# Patient Record
Sex: MALE | Race: WHITE | Employment: OTHER | URBAN - NONMETROPOLITAN AREA
[De-identification: names, ages, dates, MRNs, and addresses within clinical notes are randomized per-mention and may not be internally consistent; named-entity substitution may affect disease eponyms.]

---

## 2017-07-29 ENCOUNTER — HOSPITAL ENCOUNTER (EMERGENCY)
Age: 72
Discharge: HOME | End: 2017-07-29
Payer: MEDICARE

## 2017-07-29 VITALS
RESPIRATION RATE: 16 BRPM | OXYGEN SATURATION: 96 % | HEART RATE: 62 BPM | BODY MASS INDEX: 28.25 KG/M2 | DIASTOLIC BLOOD PRESSURE: 59 MMHG | WEIGHT: 180 LBS | SYSTOLIC BLOOD PRESSURE: 116 MMHG | TEMPERATURE: 97.8 F | HEIGHT: 67 IN

## 2017-07-29 DIAGNOSIS — H61.23 HEARING LOSS DUE TO CERUMEN IMPACTION, BILATERAL: Primary | ICD-10-CM

## 2017-07-29 PROCEDURE — 69209 REMOVE IMPACTED EAR WAX UNI: CPT

## 2017-07-29 PROCEDURE — 99213 OFFICE O/P EST LOW 20 MIN: CPT

## 2017-07-29 PROCEDURE — 6370000000 HC RX 637 (ALT 250 FOR IP): Performed by: NURSE PRACTITIONER

## 2017-07-29 PROCEDURE — 99212 OFFICE O/P EST SF 10 MIN: CPT | Performed by: NURSE PRACTITIONER

## 2017-07-29 RX ORDER — M-VIT,TX,IRON,MINS/CALC/FOLIC 27MG-0.4MG
1 TABLET ORAL DAILY
COMMUNITY

## 2017-07-29 RX ADMIN — CARBAMIDE PEROXIDE 6.5% 5 DROP: 6.5 LIQUID AURICULAR (OTIC) at 14:02

## 2017-07-29 ASSESSMENT — ENCOUNTER SYMPTOMS: RESPIRATORY NEGATIVE: 1

## 2020-12-11 RX ORDER — LACTULOSE 10 G/15ML
20 SOLUTION ORAL 3 TIMES DAILY
COMMUNITY

## 2020-12-11 RX ORDER — PHENOBARBITAL 60 MG/1
60 TABLET ORAL 2 TIMES DAILY
COMMUNITY

## 2020-12-11 RX ORDER — FENOFIBRATE 145 MG/1
145 TABLET, COATED ORAL DAILY
COMMUNITY

## 2020-12-11 RX ORDER — POLYETHYLENE GLYCOL 3350 17 G/17G
17 POWDER, FOR SOLUTION ORAL PRN
COMMUNITY

## 2020-12-11 RX ORDER — CLOPIDOGREL BISULFATE 75 MG/1
75 TABLET ORAL DAILY
COMMUNITY

## 2020-12-11 RX ORDER — LAMOTRIGINE 150 MG/1
150 TABLET ORAL 2 TIMES DAILY
COMMUNITY

## 2020-12-11 RX ORDER — ROSUVASTATIN CALCIUM 5 MG/1
5 TABLET, COATED ORAL DAILY
COMMUNITY

## 2020-12-18 ENCOUNTER — HOSPITAL ENCOUNTER (OUTPATIENT)
Age: 75
Setting detail: OUTPATIENT SURGERY
Discharge: HOME | End: 2020-12-18
Attending: SPECIALIST | Admitting: SPECIALIST
Payer: MEDICARE

## 2020-12-18 VITALS
HEIGHT: 66 IN | TEMPERATURE: 96.6 F | BODY MASS INDEX: 26.58 KG/M2 | OXYGEN SATURATION: 97 % | RESPIRATION RATE: 12 BRPM | DIASTOLIC BLOOD PRESSURE: 60 MMHG | SYSTOLIC BLOOD PRESSURE: 126 MMHG | WEIGHT: 165.4 LBS | HEART RATE: 51 BPM

## 2020-12-18 PROCEDURE — 7100000011 HC PHASE II RECOVERY - ADDTL 15 MIN: Performed by: SPECIALIST

## 2020-12-18 PROCEDURE — 3600000002 HC SURGERY LEVEL 2 BASE: Performed by: SPECIALIST

## 2020-12-18 PROCEDURE — 2500000003 HC RX 250 WO HCPCS: Performed by: SPECIALIST

## 2020-12-18 PROCEDURE — 2709999900 HC NON-CHARGEABLE SUPPLY: Performed by: SPECIALIST

## 2020-12-18 PROCEDURE — 7100000010 HC PHASE II RECOVERY - FIRST 15 MIN: Performed by: SPECIALIST

## 2020-12-18 PROCEDURE — 3600000012 HC SURGERY LEVEL 2 ADDTL 15MIN: Performed by: SPECIALIST

## 2020-12-18 RX ORDER — LIDOCAINE HYDROCHLORIDE AND EPINEPHRINE 10; 10 MG/ML; UG/ML
INJECTION, SOLUTION INFILTRATION; PERINEURAL PRN
Status: DISCONTINUED | OUTPATIENT
Start: 2020-12-18 | End: 2020-12-18 | Stop reason: ALTCHOICE

## 2020-12-18 ASSESSMENT — PAIN - FUNCTIONAL ASSESSMENT: PAIN_FUNCTIONAL_ASSESSMENT: 0-10

## 2020-12-18 NOTE — OP NOTE
Operative Note    Patient name: Erin Russell             Medical Record Number: [id number]    Primary Care Physician: Feliberto Guevara MD     1945    Date of Procedure: 2020    Pre-operative Diagnosis: 3cm2 defect of left scaphoid fossa s/p MOHS for basal cell carcinoma    Post-operative Diagnosis: Same    Procedure Performed: Full thickness skin graft (3 cm2) repair of left scaphoid fossa defect (CPT 63948)    Surgeons/Assistants: Dr. Tyra Gama     Estimated Blood Loss: 5ml     Complications: none immediately appreciated    Procedure: With the patient lying in the supine position and under adequate local anesthesia consisting of 11 ml of 1% Lidocaine 1:100,000 with epinephrine solution of the donor site of the left preauricular sulcus as well as the left ear defect. The area was prepped and draped in the standard surgical fashion. The patient has very thin skin and a large defect which could not be closed primarily, therefore a full thickness skin graft was taken. It was defatted and secured in position with a 3-0 silk suture tie and then a 5-0 fast absorbable suture was placed in a simple running fashion. A Bacitracin Xeroform and moist cotton ball tie over bolster was secured using the tails of the silk sutures. The donor site was closed with a 4-0 Monocryl suture placed in interrupted buried fashion and then Histoacryl respectively. The patient tolerated the procedure well and remained hemodynamically stable throughout the procedure and was quite comfortable throughout the operative course.     Clinical staging for cancer cases:  Ct  Cn  Cm    Tyra Gama  Electronically signed by me on 2020 at 10:47 AM  Operative Note      Patient: Erin Russell  YOB: 1945  MRN: [id number]    Date of Procedure: 2020    Pre-Op Diagnosis: BCC LEFT SCAPHOID FOSSA    Post-Op Diagnosis: Same       Procedure(s):  MOHS DEFECT REPAIR BCC LEFT SCAPHOID FOSSA    Surgeon(s):  Catarino Gonzalez MD    Assistant:   Physician Assistant: Sri Guerrero PA-C    Anesthesia: Local    Estimated Blood Loss (mL): Minimal    Complications: None    Specimens:   * No specimens in log *    Implants:  * No implants in log *      Drains: * No LDAs found *    Findings: 3cm2 defect of left scaphoid fossa s/p MOHS for basal cell carcinoma    Detailed Description of Procedure:   Full thickness skin graft (3 cm2) repair of left scaphoid fossa defect (CPT 51775)      Electronically signed by Catarino Gonzalez MD on 12/18/2020 at 10:47 AM

## 2020-12-18 NOTE — PROGRESS NOTES
1048: Patient arrived to Phase II recovery via chair. Report received from surgical RN. 1050: VSS, patient denies pain at this time. Wife brought to bedside. Snack and drink provided. Bolster dressing to L ear remains clean, dry and intact. Incision in front of L ear clean and dry with surgical glue present. Call light placed within reach. 1124: Discharge instructions reviewed with patient and patient's wife. Patient has allergy to codeine. Ivet Palacio PA-C updated on allergy and no new orders given. Norco script shredded and patient advised to take Tylenol or pain upon discharge. Patient dressed at bedside with wife present. 1140: Patient discharged home in stable condition with wife.

## 2022-04-09 ENCOUNTER — APPOINTMENT (OUTPATIENT)
Dept: GENERAL RADIOLOGY | Age: 77
End: 2022-04-09
Payer: MEDICARE

## 2022-04-09 ENCOUNTER — HOSPITAL ENCOUNTER (EMERGENCY)
Age: 77
Discharge: HOME | End: 2022-04-09
Attending: EMERGENCY MEDICINE
Payer: MEDICARE

## 2022-04-09 ENCOUNTER — APPOINTMENT (OUTPATIENT)
Dept: CT IMAGING | Age: 77
End: 2022-04-09
Payer: MEDICARE

## 2022-04-09 VITALS
SYSTOLIC BLOOD PRESSURE: 134 MMHG | RESPIRATION RATE: 16 BRPM | TEMPERATURE: 97.8 F | HEIGHT: 66 IN | OXYGEN SATURATION: 99 % | BODY MASS INDEX: 24.91 KG/M2 | HEART RATE: 50 BPM | DIASTOLIC BLOOD PRESSURE: 71 MMHG | WEIGHT: 155 LBS

## 2022-04-09 DIAGNOSIS — R56.9 SEIZURE (HCC): Primary | ICD-10-CM

## 2022-04-09 LAB
ALBUMIN SERPL-MCNC: 4.8 G/DL (ref 3.5–5.1)
ALP BLD-CCNC: 27 U/L (ref 38–126)
ALT SERPL-CCNC: 10 U/L (ref 11–66)
AMMONIA: 31 UMOL/L (ref 11–60)
ANION GAP SERPL CALCULATED.3IONS-SCNC: 12 MEQ/L (ref 8–16)
AST SERPL-CCNC: 27 U/L (ref 5–40)
BASE EXCESS MIXED: 2.9 MMOL/L (ref -2–3)
BASOPHILS # BLD: 0.8 %
BASOPHILS ABSOLUTE: 0 THOU/MM3 (ref 0–0.1)
BILIRUB SERPL-MCNC: 0.3 MG/DL (ref 0.3–1.2)
BILIRUBIN DIRECT: < 0.2 MG/DL (ref 0–0.3)
BILIRUBIN URINE: NEGATIVE
BLOOD, URINE: NEGATIVE
BUN BLDV-MCNC: 24 MG/DL (ref 7–22)
CALCIUM SERPL-MCNC: 10.5 MG/DL (ref 8.5–10.5)
CHARACTER, URINE: CLEAR
CHLORIDE BLD-SCNC: 102 MEQ/L (ref 98–111)
CO2: 27 MEQ/L (ref 23–33)
COLLECTED BY:: ABNORMAL
COLOR: YELLOW
CREAT SERPL-MCNC: 1.9 MG/DL (ref 0.4–1.2)
EOSINOPHIL # BLD: 5 %
EOSINOPHILS ABSOLUTE: 0.2 THOU/MM3 (ref 0–0.4)
ERYTHROCYTE [DISTWIDTH] IN BLOOD BY AUTOMATED COUNT: 13 % (ref 11.5–14.5)
ERYTHROCYTE [DISTWIDTH] IN BLOOD BY AUTOMATED COUNT: 50.3 FL (ref 35–45)
GFR SERPL CREATININE-BSD FRML MDRD: 35 ML/MIN/1.73M2
GLUCOSE BLD-MCNC: 133 MG/DL (ref 70–108)
GLUCOSE URINE: NEGATIVE MG/DL
HCO3, MIXED: 26 MMOL/L (ref 23–28)
HCT VFR BLD CALC: 38.5 % (ref 42–52)
HEMOGLOBIN: 12.4 GM/DL (ref 14–18)
IMMATURE GRANS (ABS): 0.01 THOU/MM3 (ref 0–0.07)
IMMATURE GRANULOCYTES: 0.3 %
KETONES, URINE: NEGATIVE
LACTIC ACID: 1.7 MMOL/L (ref 0.5–2)
LEUKOCYTE ESTERASE, URINE: NEGATIVE
LYMPHOCYTES # BLD: 32.8 %
LYMPHOCYTES ABSOLUTE: 1.3 THOU/MM3 (ref 1–4.8)
MAGNESIUM: 2.4 MG/DL (ref 1.6–2.4)
MCH RBC QN AUTO: 34.1 PG (ref 26–33)
MCHC RBC AUTO-ENTMCNC: 32.2 GM/DL (ref 32.2–35.5)
MCV RBC AUTO: 105.8 FL (ref 80–94)
MONOCYTES # BLD: 11.5 %
MONOCYTES ABSOLUTE: 0.5 THOU/MM3 (ref 0.4–1.3)
NITRITE, URINE: NEGATIVE
NUCLEATED RED BLOOD CELLS: 0 /100 WBC
O2 SAT, MIXED: 50 %
OSMOLALITY CALCULATION: 287.2 MOSMOL/KG (ref 275–300)
PCO2, MIXED VENOUS: 36 MMHG (ref 41–51)
PH UA: 8 (ref 5–9)
PH, MIXED: 7.48 (ref 7.31–7.41)
PLATELET # BLD: 180 THOU/MM3 (ref 130–400)
PMV BLD AUTO: 9 FL (ref 9.4–12.4)
PO2 MIXED: 25 MMHG (ref 25–40)
POTASSIUM REFLEX MAGNESIUM: 4.6 MEQ/L (ref 3.5–5.2)
PROCALCITONIN: 0.06 NG/ML (ref 0.01–0.09)
PROTEIN UA: NEGATIVE
RBC # BLD: 3.64 MILL/MM3 (ref 4.7–6.1)
SEG NEUTROPHILS: 49.6 %
SEGMENTED NEUTROPHILS ABSOLUTE COUNT: 2 THOU/MM3 (ref 1.8–7.7)
SODIUM BLD-SCNC: 141 MEQ/L (ref 135–145)
SPECIFIC GRAVITY, URINE: 1.01 (ref 1–1.03)
TOTAL CK: 257 U/L (ref 55–170)
TOTAL PROTEIN: 7.6 G/DL (ref 6.1–8)
TROPONIN T: 0.02 NG/ML
TROPONIN T: < 0.01 NG/ML
UROBILINOGEN, URINE: 0.2 EU/DL (ref 0–1)
WBC # BLD: 4 THOU/MM3 (ref 4.8–10.8)

## 2022-04-09 PROCEDURE — 71046 X-RAY EXAM CHEST 2 VIEWS: CPT

## 2022-04-09 PROCEDURE — 80076 HEPATIC FUNCTION PANEL: CPT

## 2022-04-09 PROCEDURE — 82550 ASSAY OF CK (CPK): CPT

## 2022-04-09 PROCEDURE — 85025 COMPLETE CBC W/AUTO DIFF WBC: CPT

## 2022-04-09 PROCEDURE — 80048 BASIC METABOLIC PNL TOTAL CA: CPT

## 2022-04-09 PROCEDURE — 84484 ASSAY OF TROPONIN QUANT: CPT

## 2022-04-09 PROCEDURE — 70450 CT HEAD/BRAIN W/O DYE: CPT

## 2022-04-09 PROCEDURE — 93005 ELECTROCARDIOGRAM TRACING: CPT | Performed by: STUDENT IN AN ORGANIZED HEALTH CARE EDUCATION/TRAINING PROGRAM

## 2022-04-09 PROCEDURE — 2580000003 HC RX 258: Performed by: STUDENT IN AN ORGANIZED HEALTH CARE EDUCATION/TRAINING PROGRAM

## 2022-04-09 PROCEDURE — 83605 ASSAY OF LACTIC ACID: CPT

## 2022-04-09 PROCEDURE — 83735 ASSAY OF MAGNESIUM: CPT

## 2022-04-09 PROCEDURE — 82140 ASSAY OF AMMONIA: CPT

## 2022-04-09 PROCEDURE — 81003 URINALYSIS AUTO W/O SCOPE: CPT

## 2022-04-09 PROCEDURE — 36415 COLL VENOUS BLD VENIPUNCTURE: CPT

## 2022-04-09 PROCEDURE — 99285 EMERGENCY DEPT VISIT HI MDM: CPT

## 2022-04-09 PROCEDURE — 82803 BLOOD GASES ANY COMBINATION: CPT

## 2022-04-09 PROCEDURE — 84145 PROCALCITONIN (PCT): CPT

## 2022-04-09 RX ORDER — 0.9 % SODIUM CHLORIDE 0.9 %
1000 INTRAVENOUS SOLUTION INTRAVENOUS ONCE
Status: COMPLETED | OUTPATIENT
Start: 2022-04-09 | End: 2022-04-09

## 2022-04-09 RX ADMIN — SODIUM CHLORIDE 1000 ML: 9 INJECTION, SOLUTION INTRAVENOUS at 11:55

## 2022-04-09 ASSESSMENT — ENCOUNTER SYMPTOMS
FACIAL SWELLING: 0
NAUSEA: 0
PHOTOPHOBIA: 0
VOMITING: 0
CONSTIPATION: 0
SHORTNESS OF BREATH: 0
SINUS PAIN: 0
ABDOMINAL PAIN: 0
SINUS PRESSURE: 0
CHEST TIGHTNESS: 0
COUGH: 0
DIARRHEA: 0
WHEEZING: 0

## 2022-04-09 NOTE — ED TRIAGE NOTES
Patient presents via [facility] to ER with complaints of dysarthria and blurry vision that started this morning at 0750. EMS obtained blood sugar with result 138. Patient alert and oriented x4. NIHSS obtained. Family in room reports patient has history of seizure. EKG obtained. Telemetry applied.

## 2022-04-09 NOTE — ED PROVIDER NOTES
[facility] ENCOUNTER          Pt Name: Noah Obrien  MRN: [id number]  [place] 1945  Date of evaluation: 4/9/2022  Treating Resident Physician: Mirna Presley DO  Supervising Physician: Dr. Diann Mazariegos       Chief Complaint   Patient presents with    Other     Dysarthria    Blurred Vision     History obtained from the patient. HISTORY OF PRESENT ILLNESS    HPI  Noah Obrien is a 69 y.o. male with past medical history of HLD, hypothyroidism, primary generalized epilepsy, CVA, sleep apnea, CKD III who presents to the emergency department for evaluation of dysarthria and blurred vision. He reports sudden onset vision disturbance at around 8 AM this morning described as \"out of focus\". He notes associated dysarthria but denies any weakness or headaches. Of note, patient follows with Dr. Edilma Leija at [facility]  Neurology.    The patient has no other acute complaints at this time. Last seen 3/9/2022. Per wife medications were adjusted with intent of weaning off phenobarbital and increasing dosage of Vimpat. REVIEW OF SYSTEMS   Review of Systems   Constitutional: Positive for activity change. Negative for appetite change, chills, fatigue and fever. HENT: Negative for congestion, facial swelling, sinus pressure and sinus pain. Eyes: Positive for visual disturbance. Negative for photophobia. Respiratory: Negative for cough, chest tightness, shortness of breath and wheezing. Cardiovascular: Negative for chest pain, palpitations and leg swelling. Gastrointestinal: Negative for abdominal pain, constipation, diarrhea, nausea and vomiting. Endocrine: Negative for polyuria. Genitourinary: Negative for difficulty urinating, dysuria, frequency and hematuria. Musculoskeletal: Negative. Skin: Negative. Neurological: Positive for speech difficulty. Negative for dizziness, weakness, numbness and headaches. Psychiatric/Behavioral: Positive for agitation. Negative for behavioral problems and confusion. The patient is not nervous/anxious. PAST MEDICAL AND SURGICAL HISTORY     Past Medical History:   Diagnosis Date    Arthritis     Cancer ([facility] [address]     skin    Cerebral artery occlusion with cerebral infarction ([facility] [address]     Hyperlipidemia     Hypothyroid     Kidney disease     Seizures ([facility] [address]     Sleep apnea      Past Surgical History:   Procedure Laterality Date    EYE SURGERY      HEMORRHOID SURGERY      JOINT REPLACEMENT      hip    MOHS SURGERY Left 12/18/2020    MOHS DEFECT REPAIR BCC LEFT SCAPHOID FOSSA performed by Colton Harper MD at [facility] [address]   No current facility-administered medications for this encounter. Current Outpatient Medications:     potassium & sodium phosphates (PHOS-NAK) 280-160-250 MG PACK, Take 1 packet by mouth 2 times daily, Disp: , Rfl:     PHENobarbital (LUMINAL) 60 MG tablet, Take 60 mg by mouth 2 times daily. , Disp: , Rfl:     clopidogrel (PLAVIX) 75 MG tablet, Take 75 mg by mouth daily, Disp: , Rfl:     lamoTRIgine (LAMICTAL) 150 MG tablet, Take 150 mg by mouth 2 times daily 1.5 tabs, Disp: , Rfl:     fenofibrate (TRICOR) 145 MG tablet, Take 145 mg by mouth daily, Disp: , Rfl:     lactulose (CHRONULAC) 10 GM/15ML solution, Take 20 g by mouth 3 times daily, Disp: , Rfl:     rosuvastatin (CRESTOR) 5 MG tablet, Take 5 mg by mouth daily Sunday, Tuesday, Thursday, saturday, Disp: , Rfl:     Iron-Folic Acid-Vit H92 (IRON FORMULA PO), Take 65 mg by mouth daily, Disp: , Rfl:     polyethylene glycol (MIRALAX) 17 g PACK packet, Take 17 g by mouth as needed, Disp: , Rfl:     Multiple Vitamins-Minerals (THERAPEUTIC MULTIVITAMIN-MINERALS) tablet, Take 1 tablet by mouth daily, Disp: , Rfl:     THYROID PO, Take 50 mcg by mouth Five times weekly. , Disp: , Rfl:     Cholecalciferol (VITAMIN D3) 3000 UNITS TABS, Take 400 Units by mouth Daily with supper., Disp: , Rfl:     Calcium-Magnesium-Vitamin D (CALCIUM MAGNESIUM PO), Take  by mouth three times daily. , Disp: , Rfl:       SOCIAL HISTORY     Social History     Social History Narrative    Not on file     Social History     Tobacco Use    Smoking status: Never Smoker    Smokeless tobacco: Not on file   Substance Use Topics    Alcohol use: No    Drug use: No         ALLERGIES     Allergies   Allergen Reactions    Ciprofloxacin Itching    Fentanyl Other (See Comments)     Stopped breathing    Statins     Versed [Midazolam] Other (See Comments)     Stopped breathing    Codeine Rash    Metronidazole Rash         FAMILY HISTORY   No family history on file. PREVIOUS RECORDS   Previous records reviewed: Prior hospitalization records        PHYSICAL EXAM     ED Triage Vitals   BP Temp Temp src Pulse Resp SpO2 Height Weight   -- -- -- -- -- -- -- --     Initial vital signs and nursing assessment reviewed and abnormal from hypertension. Body mass index is 25.02 kg/m². Pulsoximetry is normal per my interpretation. Additional Vital Signs:  Vitals:    04/09/22 1205   BP: 136/67   Pulse: (!) 49   Resp: 16   Temp:    SpO2: 97%       Physical Exam  Constitutional:       General: He is not in acute distress. Appearance: Normal appearance. He is normal weight. He is not ill-appearing. HENT:      Head: Normocephalic and atraumatic. Right Ear: External ear normal. There is no impacted cerumen. Left Ear: External ear normal. There is no impacted cerumen. Nose: Nose normal.      Mouth/Throat:      Mouth: Mucous membranes are moist.      Pharynx: Oropharynx is clear. No oropharyngeal exudate. Eyes:      Extraocular Movements: Extraocular movements intact. Conjunctiva/sclera: Conjunctivae normal.      Pupils: Pupils are equal, round, and reactive to light. Cardiovascular:      Rate and Rhythm: Bradycardia present. Pulses: Normal pulses.       Heart sounds: Normal heart sounds. No murmur heard. Pulmonary:      Effort: Pulmonary effort is normal.      Breath sounds: Normal breath sounds. No wheezing, rhonchi or rales. Abdominal:      General: Abdomen is flat. Bowel sounds are normal. There is no distension. Palpations: Abdomen is soft. Tenderness: There is no abdominal tenderness. Musculoskeletal:         General: Normal range of motion. Cervical back: Normal range of motion and neck supple. No tenderness. Right lower leg: No edema. Left lower leg: No edema. Skin:     General: Skin is warm and dry. Capillary Refill: Capillary refill takes less than 2 seconds. Neurological:      General: No focal deficit present. Mental Status: He is alert and oriented to person, place, and time. Cranial Nerves: No cranial nerve deficit. Sensory: No sensory deficit. Motor: No weakness. Comments: Mild dysarthria, NIHSS 1   Psychiatric:         Mood and Affect: Mood normal.         Behavior: Behavior normal.         Thought Content: Thought content normal.             MEDICAL DECISION MAKING   Initial Assessment: 69-year-old male presents with acute onset blurred vision described \"out of focus\" and dysarthria. Per family when EMS arrived SPO2 83%. Resolved without oxygen. Blood sugar 138. Currently AAO x4. NIHSS 0. Prior history of primary generalized epilepsy with recent medication adjustments by neurologist.  Reports compliance with medications. Last known seizure/staring episode this past Monday. Differential DX includes:  1. Seizure episode. 2. Unlikely stroke  3. Unlikely infectious etiology  Plan:    Check CBC, BMP and LFT   Check troponin   EKG ordered   Check CXR and VBG to rule out pulmonary cause   Check CK, prolactin lactic acid to rule out inflammatory   Check urinalysis reflex to culture.  Ordered CT head.         ED RESULTS   Laboratory results:  Labs Reviewed   CBC WITH AUTO DIFFERENTIAL - Abnormal; Notable for the following components:       Result Value    WBC 4.0 (*)     RBC 3.64 (*)     Hemoglobin 12.4 (*)     Hematocrit 38.5 (*)     .8 (*)     MCH 34.1 (*)     RDW-SD 50.3 (*)     MPV 9.0 (*)     All other components within normal limits   BASIC METABOLIC PANEL W/ REFLEX TO MG FOR LOW K - Abnormal; Notable for the following components:    Glucose 133 (*)     BUN 24 (*)     CREATININE 1.9 (*)     All other components within normal limits   CK - Abnormal; Notable for the following components: Total  (*)     All other components within normal limits   TROPONIN - Abnormal; Notable for the following components:    Troponin T 0.016 (*)     All other components within normal limits   BLOOD GAS, VENOUS - Abnormal; Notable for the following components:    PH MIXED 7.48 (*)     PCO2, MIXED VENOUS 36 (*)     All other components within normal limits   HEPATIC FUNCTION PANEL - Abnormal; Notable for the following components:    Alkaline Phosphatase 27 (*)     ALT 10 (*)     All other components within normal limits   GLOMERULAR FILTRATION RATE, ESTIMATED - Abnormal; Notable for the following components:    Est, Glom Filt Rate 35 (*)     All other components within normal limits   LACTIC ACID   URINALYSIS WITH REFLEX TO CULTURE   PROCALCITONIN   AMMONIA   MAGNESIUM   ANION GAP   OSMOLALITY   TROPONIN       Radiologic studies results:  CT Head WO Contrast   Final Result       1. Mild atrophy and encephalomalacia in the right occipital lobe. 2. Diminished attenuation in the white matter most likely representing ischemic changes. 3. Postoperative changes involving the left frontal calvarium and superolateral aspect of the left orbit. 4. Inflammatory changes in the right left maxillary sinuses and in the ethmoid air cells bilaterally. .               **This report has been created using voice recognition software.  It may contain minor errors which are inherent in voice recognition technology. **      Final report electronically signed by DR Kirby Braden on 4/9/2022 10:36 AM      XR CHEST (2 VW)   Final Result   1. No acute cardiopulmonary disease. 2. Compression deformities in the midthoracic spine. .               **This report has been created using voice recognition software. It may contain minor errors which are inherent in voice recognition technology. **      Final report electronically signed by DR Kirby Braden on 4/9/2022 10:45 AM          ED Medications administered this visit:   Medications   0.9 % sodium chloride bolus (1,000 mLs IntraVENous New Bag 4/9/22 1155)         ED COURSE        In the ED, vitals T-max 97.8, RR 16, HR 53, /87, SPO2 97% RA. Labs significant for WBC 4.0, Hgb 12.4, HCT 38.5, BUN 24, creatinine 1.9, EGFR 35. Procal 0.06, Lactic acid 1.7, . Troponin 0.016, repeat < 0.010. EKG revealed sinus bradycardia with ventricular rate 53. LFTs albumin 4.8, AST 27, ALT 10, alk phos 27, bilirubin 0.3. Ammonia 31. UA negative. VBG pH 7.48, PCO2 36, PO2 25, HCO3 26. CXR no acute cardiopulmonary disease. CT head demonstrated mild atrophy and encephalomalacia in the right occipital lobe, diminished attenuation in white matter likely representing ischemic changes, laboratory changes in right left maxillary sinuses and ethmoid air cells bilaterally. NIHSS score 1. On physical exam mild dysarthria noted. CN II-XII intact, muscle strength 4/5, sensation 5/5, DTR 2/4, negative pronator drift. He was given 1L NS x1. Discussed with patient and family symptoms likely secondary to seizure. Advised follow-up with outpatient neurologist regarding further dose adjustments to current antiepileptic regimen. Note, BUN:Cr < 20, suggestive of intrinsic etiology. Consider AIN given long-term use of anticonvulsants.   Advised follow-up with primary and consider outpatient renal work-up    Strict return precautions and follow up instructions were discussed with the patient prior to discharge, with which the patient agrees. MEDICATION CHANGES     New Prescriptions    No medications on file         FINAL DISPOSITION     Final diagnoses:   Seizure ([facility] [address]     Condition: condition: fair  Dispo: Discharge to home      This transcription was electronically signed. Parts of this transcriptions may have been dictated by use of voice recognition software and electronically transcribed, and parts may have been transcribed with the assistance of an ED scribe. The transcription may contain errors not detected in proofreading. Please refer to my supervising physician's documentation if my documentation differs.     Electronically Signed: Kennedy Kawasaki, DO, 04/09/22, 1:02 PM       Kennedy Kawasaki, DO  Resident  04/09/22 5931

## 2022-04-09 NOTE — ED PROVIDER NOTES
PATIENT NAME: Mona Khan  MRN: [id number]  : 1945  [place]: 2022      I personally saw and examined the patient. I have reviewed and agreed with the resident physician's findings including all diagnostic interpretations and treatment plans as written. Please see resident physician's chart for detailed history of present illness, physical exam and medical decision making. I was present for the key portions of any procedures performed and the inclusive time noted in any critical care statement. MEDICAL DEDISION MAKING (MDM)     Mona Khan is a 69 y.o. male who presents to Emergency Department with Other (Dysarthria) and Blurred Vision     A 18-year-old male with past medical history of seizure and TIA was brought in by EMS because of slow speech and altered mental status. Patient was at a [employer] breakfast around 8 AM and patient was found by his son slumped over on the table, confused with slurred speech. No focal weakness. Accu-Chek was normal.   Last time patient was noticed well was 7:50 AM.    NIHSS is 1 (slight dysarthria). ECG is normal.     ED labs and imaging studies are reviewed which are reassuring and at his baselines. Patient's history, exam, and the ED work-ups do not suggest acute CVA. He has a neurologist established in [place] and he will call tomorrow to schedule appointment within next week.     Vitals:    22 0942 22 1035 22 1205 22 1250   BP: (!) 155/77 138/82 136/67 134/71   Pulse: 53 50 (!) 49 50   Resp: 16  16 16   Temp: 97.8 °F (36.6 °C)      TempSrc: Oral      SpO2: 97% 94% 97% 99%   Weight: 155 lb (70.3 kg)      Height: 5' 6\" (1.676 m)          Labs Reviewed   CBC WITH AUTO DIFFERENTIAL - Abnormal; Notable for the following components:       Result Value    WBC 4.0 (*)     RBC 3.64 (*)     Hemoglobin 12.4 (*)     Hematocrit 38.5 (*)     .8 (*)     MCH 34.1 (*)     RDW-SD 50.3 (*)     MPV 9.0 (*)     All other components within normal limits   BASIC METABOLIC PANEL W/ REFLEX TO MG FOR LOW K - Abnormal; Notable for the following components:    Glucose 133 (*)     BUN 24 (*)     CREATININE 1.9 (*)     All other components within normal limits   CK - Abnormal; Notable for the following components: Total  (*)     All other components within normal limits   TROPONIN - Abnormal; Notable for the following components:    Troponin T 0.016 (*)     All other components within normal limits   BLOOD GAS, VENOUS - Abnormal; Notable for the following components:    PH MIXED 7.48 (*)     PCO2, MIXED VENOUS 36 (*)     All other components within normal limits   HEPATIC FUNCTION PANEL - Abnormal; Notable for the following components:    Alkaline Phosphatase 27 (*)     ALT 10 (*)     All other components within normal limits   GLOMERULAR FILTRATION RATE, ESTIMATED - Abnormal; Notable for the following components:    Est, Glom Filt Rate 35 (*)     All other components within normal limits   LACTIC ACID   URINALYSIS WITH REFLEX TO CULTURE   PROCALCITONIN   AMMONIA   MAGNESIUM   ANION GAP   OSMOLALITY   TROPONIN       CT Head WO Contrast   Final Result       1. Mild atrophy and encephalomalacia in the right occipital lobe. 2. Diminished attenuation in the white matter most likely representing ischemic changes. 3. Postoperative changes involving the left frontal calvarium and superolateral aspect of the left orbit. 4. Inflammatory changes in the right left maxillary sinuses and in the ethmoid air cells bilaterally. .               **This report has been created using voice recognition software. It may contain minor errors which are inherent in voice recognition technology. **      Final report electronically signed by DR Eldon Hare on 4/9/2022 10:36 AM      XR CHEST (2 VW)   Final Result   1. No acute cardiopulmonary disease. 2. Compression deformities in the midthoracic spine. .               **This report has been created using voice recognition software. It may contain minor errors which are inherent in voice recognition technology. **      Final report electronically signed by DR Beatriz Bills on 4/9/2022 10:45 AM            FINAL IMPRESSION AND DISPOSITION      1.  Seizure [facility])        Discharge home    PATIENT REFERRED TO:  Jimbo Ziegler MD  [id number] [place] [address], [place] [phone]    Schedule an appointment as soon as possible for a visit       Ladonna Kearney MD  [address]  [address] [place] [address] [address] [place] [address]  [phone]    Schedule an appointment as soon as possible for a visit         DISCHARGE MEDICATIONS:  Discharge Medication List as of 4/9/2022  1:35 PM          (Please note that portions of this note were completed with a voice recognition program.  Efforts were made to edit the dictations but occasionally words aremis-transcribed.)    MD Nakul Wooten MD  04/09/22 4212

## 2022-04-10 LAB
EKG ATRIAL RATE: 53 BPM
EKG P AXIS: 20 DEGREES
EKG P-R INTERVAL: 202 MS
EKG Q-T INTERVAL: 428 MS
EKG QRS DURATION: 76 MS
EKG QTC CALCULATION (BAZETT): 401 MS
EKG R AXIS: 13 DEGREES
EKG T AXIS: 41 DEGREES
EKG VENTRICULAR RATE: 53 BPM

## 2022-04-10 PROCEDURE — 93010 ELECTROCARDIOGRAM REPORT: CPT | Performed by: INTERNAL MEDICINE

## 2025-07-30 ENCOUNTER — PROCEDURE VISIT (OUTPATIENT)
Dept: NEUROLOGY | Age: 80
End: 2025-07-30

## 2025-07-30 DIAGNOSIS — R20.0 BILATERAL HAND NUMBNESS: ICD-10-CM

## 2025-07-30 DIAGNOSIS — G56.03 BILATERAL CARPAL TUNNEL SYNDROME: Primary | ICD-10-CM

## 2025-07-30 DIAGNOSIS — M54.12 CERVICAL RADICULOPATHY: ICD-10-CM

## (undated) DEVICE — PACK PROCEDURE SURG PLAS SC MIN SRHP LF

## (undated) DEVICE — DRESSING,GAUZE,XEROFORM,CURAD,5"X9",ST: Brand: CURAD

## (undated) DEVICE — GLOVE SURG SZ 65 THK91MIL LTX FREE SYN POLYISOPRENE

## (undated) DEVICE — GLOVE ORANGE PI 7   MSG9070

## (undated) DEVICE — SPONGE GZ W4XL4IN COT 12 PLY TYP VII WVN C FLD DSGN

## (undated) DEVICE — STERILE COTTON BALLS LARGE 5/P: Brand: MEDLINE

## (undated) DEVICE — GLOVE SURG SZ 8 L11.77IN FNGR THK9.8MIL STRW LTX POLYMER